# Patient Record
Sex: MALE | URBAN - METROPOLITAN AREA
[De-identification: names, ages, dates, MRNs, and addresses within clinical notes are randomized per-mention and may not be internally consistent; named-entity substitution may affect disease eponyms.]

---

## 2023-07-11 ENCOUNTER — OFFICE VISIT (OUTPATIENT)
Facility: LOCATION | Age: 58
End: 2023-07-11
Payer: COMMERCIAL

## 2023-07-11 DIAGNOSIS — H40.1131 PRIMARY OPEN-ANGLE GLAUCOMA, `BILATERAL`, MILD STAGE: ICD-10-CM

## 2023-07-11 DIAGNOSIS — H25.813 COMBINE FORMS AGE-RELATE CAT BILAT: Primary | ICD-10-CM

## 2023-07-11 PROCEDURE — 99214 OFFICE O/P EST MOD 30 MIN: CPT | Performed by: OPHTHALMOLOGY

## 2023-07-11 ASSESSMENT — INTRAOCULAR PRESSURE
OD: 17
OS: 18

## 2023-07-11 NOTE — IMPRESSION/PLAN
Impression: Primary open-angle glaucoma, `BILATERAL`, mild stage. Plan: Gonio: 10/27/22  VF: 10/27/22  RNFL: 01/15/21  Optos: 10/27/22  DFE: 10/27/22 Mild Primary Open Angle Glaucoma OU. -FH/-Steroids/-Trauma CCT: 439/441 Gonio: Open to deep CBB 2+ OU
IOP TMAX 34/36, now better VF: Scattered OU(stable) RNFL:no thinning (largely stable) CCM [facility] QHS OU Recommend initial baseline OCT of the nerves, CCT, Visual Fields, and ON Photos. Careful observation of intraocular pressures, anatomical, and functional tests are recommended. Patient understands that permanant blindness can occur without adequate pressure control and observation. Compliance is strongly urged. The patient is to call back with any worsening of symptoms or concerns.

## 2023-07-11 NOTE — IMPRESSION/PLAN
Impression: Combine Forms Age-Relate Cat Bilat.  '10/27/22 Dx Last Visit' Plan: Cataract OU: Observe for now without intervention. The patient was advised to contact us if any change or worsening of vision.

## 2023-11-01 ENCOUNTER — OFFICE VISIT (OUTPATIENT)
Facility: LOCATION | Age: 58
End: 2023-11-01
Payer: COMMERCIAL

## 2023-11-01 DIAGNOSIS — H40.1131 PRIMARY OPEN-ANGLE GLAUCOMA, `BILATERAL`, MILD STAGE: Primary | ICD-10-CM

## 2023-11-01 DIAGNOSIS — H25.813 COMBINE FORMS AGE-RELATE CAT BILAT: ICD-10-CM

## 2023-11-01 PROCEDURE — 99214 OFFICE O/P EST MOD 30 MIN: CPT | Performed by: OPHTHALMOLOGY

## 2023-11-01 PROCEDURE — 92133 CPTRZD OPH DX IMG PST SGM ON: CPT | Performed by: OPHTHALMOLOGY

## 2023-11-01 PROCEDURE — 92083 EXTENDED VISUAL FIELD XM: CPT | Performed by: OPHTHALMOLOGY

## 2023-11-01 ASSESSMENT — INTRAOCULAR PRESSURE
OS: 19
OS: 20
OD: 19
OD: 20

## 2025-01-02 ENCOUNTER — OFFICE VISIT (OUTPATIENT)
Facility: LOCATION | Age: 60
End: 2025-01-02
Payer: COMMERCIAL

## 2025-01-02 DIAGNOSIS — H40.1131 PRIMARY OPEN-ANGLE GLAUCOMA, `BILATERAL`, MILD STAGE: Primary | ICD-10-CM

## 2025-01-02 DIAGNOSIS — H25.813 COMBINE FORMS AGE-RELATE CAT BILAT: ICD-10-CM

## 2025-01-02 PROCEDURE — 92014 COMPRE OPH EXAM EST PT 1/>: CPT | Performed by: OPHTHALMOLOGY

## 2025-01-02 RX ORDER — LATANOPROSTENE BUNOD 0.24 MG/ML
0.024 % SOLUTION/ DROPS OPHTHALMIC
Qty: 7.5 | Refills: 4 | Status: ACTIVE
Start: 2025-01-02